# Patient Record
Sex: FEMALE | Race: WHITE | Employment: OTHER | URBAN - METROPOLITAN AREA
[De-identification: names, ages, dates, MRNs, and addresses within clinical notes are randomized per-mention and may not be internally consistent; named-entity substitution may affect disease eponyms.]

---

## 2017-05-15 ENCOUNTER — HOSPITAL ENCOUNTER (OUTPATIENT)
Dept: PHYSICAL THERAPY | Age: 62
Discharge: HOME | End: 2017-05-15
Payer: MEDICAID

## 2017-05-15 PROCEDURE — 97165 OT EVAL LOW COMPLEX 30 MIN: CPT

## 2017-05-15 PROCEDURE — 97018 PARAFFIN BATH THERAPY: CPT

## 2017-05-15 NOTE — PROGRESS NOTES
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]  Rheumatoid arthritis without rheumatoid factor, multiple sites [M06.09]    SUBJECTIVE  Pain Level (0-10 scale): 0-1/10  Any medication changes, allergies to medications, adverse drug reactions, diagnosis change, or new procedure performed?: [x] No    [] Yes (see summary sheet for update)  Subjective functional status/changes:   [] No changes reported  I have trouble with buttons, writing, scissors, piano, oral care    OBJECTIVE    Modality rationale: decrease pain and increase tissue extensibility to improve the patients ability to grasp   Min Type Additional Details    [] Estim:  []Unatt       []IFC  []Premod                        []Other:  []w/ice   []w/heat  Position:  Location:    [] Estim: []Att    []TENS instruct  []NMES                    []Other:  []w/US   []w/ice   []w/heat  Position:  Location:    []  Traction: [] Cervical       []Lumbar                       [] Prone          []Supine                       []Intermittent   []Continuous Lbs:  [] before manual  [] after manual    []  Ultrasound: []Continuous   [] Pulsed                           []1MHz   []3MHz W/cm2:  Location:    []  Iontophoresis with dexamethasone         Location: [] Take home patch   [] In clinic    []  Ice     []  heat  []  Ice massage  []  Laser   []  Anodyne Position:  Location:     10 []  Laser with stim  [x]  Other: Paraffin Position:  Location:r hand    []  Vasopneumatic Device Pressure:       [] lo [] med [] hi   Temperature: [] lo [] med [] hi   [x] Skin assessment post-treatment:  [x]intact []redness- no adverse reaction    []redness  adverse reaction:       With   [] TE [] TA   [] neuro   [] other: Patient Education: [x] Review HEP    [] Progressed/Changed HEP based on: Role of OT  [] positioning   [] body mechanics   [] transfers   [] heat/ice application   [] Splint wear/care   [] Sensory re-education   [] scar management      [] other:             Other Objective/Functional Measures:   Subjective: pt is a right hand dominant, 64 y.o.y/o, female who has had increasing essential tremors with swelling in R hand about 8 months ago. As tremors worsen, swelling seems to increase. Prior level of function: Hairdresser, grandkids, walking  Pain level:(0-no pain 10-debilitating pain) 1/10    Description/Location: right hand with c/o intermittent relief   Worst pain5/10 Least pain *0-1*/10   Activities which aggravate pain: activity, fatigue, stress,    Activities which ease pain: rest, meds  Current functional limitations/living situation: with daughter and son in law in house,  writing, brushing teeth, flossing, fasteners, lifting pans, cutting chopping, scissors, turning keys,     Medical hx: Bipolar, Sjogren's    Medications: See the written copy of this report in the patient's paper medical record.        Objective:  Edema present at MCPs 2,3  Sensation:some numbness with swelling      Strength:    Right Left   Shoulder Flex      Ext      abd      Horizontal add      IR      ER     Elbow Ext/flex     Forearm Supination      Pronation     Wrist Flex 55 65    Ext 55 75    Ulnar Dev 25 30    Radial Dev 10 20     Hand ROM    Index Middle Ring Small Thumb    MP 20-70 20-90 15-90 0-100  CMC   PIP 0-95 0-95 0-95 0-100 45 MP   DIP 0-60 0-75 0-50 0-50 50 IP         Wen Abd         Radial Abd   Radial digit walk WFL    Hand ROM    Index Middle Ring Small Thumb    MP   90   CMC   PIP   110   MP   DIP   90   IP         Wen Abd         Radial Abd       Hand Strength:   Gross Grasp 3pt Pinch Lateral Pinch Tip Pinch   Right  22 7 9 5   Left 30 13 12 11     Nine-Hole Peg Test:  Left= _23____seconds  Right=_30____seconds  Finger Opposition:WNL       Palpation: slight tenderness on MCP    ADLs  Feeding:        []MaxA   []ModA   [x]Wood   [] CGA   []SBA   []Facundo   []Independent  UE Dressing:       []MaxA   []ModA   [x]Wood   [] CGA   []SBA   []Facundo   []Independent  LE Dressing:       []MaxA   []ModA   [x]Wood   [] CGA   []SBA   []Facundo   []Independent  Grooming:       []MaxA   [x]ModA   []Wood   [] CGA   []SBA   []Facundo   []Independent  Toileting:       []MaxA   []ModA   []Wood   [] CGA   []SBA   []Facundo   [x]Independent  Bathing:       []MaxA   []ModA   []Wood   [] CGA   []SBA   []Facundo   [x]Independent  Light Meal Prep:    []MaxA   [x]ModA   []Wood   [] CGA   []SBA   []Facundo   []Independent  Household/Other: []MaxA   []ModA   []Wood   [] CGA   []SBA   []Facundo   [x]Independent  Adaptive Equip:     []MaxA   []ModA   []Wood   [] CGA   []SBA   []Facundo   []Independent  Driving:       []MaxA   []ModA   []Wood   [] CGA   []SBA   []Facundo   [x]Independent  Money Mgmt:        []MaxA   []ModA   [x]Wood   [] CGA   []SBA   []Facundo   []Independent         Pain Level (0-10 scale) post treatment: 0/10    ASSESSMENT/Changes in Function: *  X  See Plan of Care  []  See progress note/recertification  []  See Discharge Summary           PLAN  []  Upgrade activities as tolerated     []  Continue plan of care  []  Update interventions per flow sheet       []  Discharge due to:_  []  Other:_      OLEG Cody/L 5/15/2017  8:01 AM    No future appointments.

## 2017-05-15 NOTE — PROGRESS NOTES
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 Assessment/ key information: 64year old RHD female with multi year history of essential tremors which became worse in last 8 months and was accompanied by joint swelling and pain in r hand. Patient reports pain is 1/10 in morning and becomes worse as day goes on. Digit AROM is limited in extension at MCPs of digits 2-4, and in flexion at all joints. She shows ulnar deviation of digits on r which she is able to correct actively. She presents with edema in MCPs of digits 2,3 which are tender when struck. She has marked tremors in R hand that are present both at rest and with activity. Her strength is limited in both hands with  of 22 in R and 30 in L hand. Fine motor skills are slowed in R with 30 seconds for 9 hole peg test.  She reports difficulty with self care including grooming especially oral care, fasteners, writing, scissor use, opening packages and jars, and playing piano. She is recently  and diagnosed with bipolar disorder. She is living with daughter, son in law and grandchildren who are supportive.   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 She will benefit from skilled occupational therapy to improve quality of life with chronic tremors, reduce hand pain, improve ROM and functional independence. Evaluation Complexity: History LOW Complexity : Brief history review  Examination LOW Complexity : 1-3 performance deficits relating to physical, cognitive , or psychosocial skils that result in activity limitations and / or participation restrictions  Clinical Decision Making MEDIUM Complexity : Patient may present with comorbidities that affect occupational performnce. Miniml to moderate modification of tasks or assistance (eg, physical or verbal ) with assesment(s) is necessary to enable patient to complete evaluation   Overall Complexity Rating: LOW     Problem List: Pain effecting function, Decreased range of motion, Decreased strength, Edema effecting function, Decreased ADL/functional abilities  and Decreased activity tolerance     Treatment Plan may include any combination of the following: Therapeutic exercise, Therapeutic activities, Physical agent/modality, Splinting/orthoses, Patient education and ADLs/IADLs    Patient / Family readiness to learn indicated by: asking questions, trying to perform skills and interest    Persons(s) to be included in education:   patient (P)    Barriers to Learning/Limitations: None    Patient Goal (s): Mobility    Patient Self Reported Health Status: good    Rehabilitation Potential: good    Short Term Goals: To be accomplished in 2 weeks:  1. Patient will be familiar with home paraffin bath for relief of symptoms  2. Patient will be fitted with appropriate orthosis to reduce contracture risk  3. Patient will be independent in HEP to preserve ROM for function. 4.  Patient will be familiar with joint protection strategies to ease ADL/IADLs. Long Term Goals: To be accomplished in 8 treatments:   1.   Patient will incorporate joint protection and adaptive techniques/devices into self care and home care to improve function. 2.  Patient will report reduced pain with use of paraffin and exercises. 3.  Patient will report improved performance in piano and self care due to improved grasp and digit abduction. Frequency / Duration: Patient to be seen 2 times per week for 8 treatments:    Patient/ Caregiver education and instruction: Diagnosis, prognosis, self care, activity modification, brace/ splint application and exercises   [x]  Plan of care has been reviewed with TISHA Mckeon 5/15/2017 9:57 AM    _____________________________________________________________________    I certify that the above Therapy Services are being furnished while the patient is under my care. I agree with the treatment plan and certify that this therapy is necessary.     Physician's Signature:____________________  Date:____________Time:__________    Please sign and return to [facility]  [address]  [phone] fax

## 2017-05-18 ENCOUNTER — HOSPITAL ENCOUNTER (OUTPATIENT)
Dept: PHYSICAL THERAPY | Age: 62
Discharge: HOME | End: 2017-05-18
Payer: MEDICAID

## 2017-05-18 PROCEDURE — 97110 THERAPEUTIC EXERCISES: CPT

## 2017-05-18 PROCEDURE — 97760 ORTHOTIC MGMT&TRAING 1ST ENC: CPT

## 2017-05-18 NOTE — PROGRESS NOTES
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]  Rheumatoid arthritis without rheumatoid factor, multiple sites [M06.09]    SUBJECTIVE  Pain Level (0-10 scale): 0/10  Any medication changes, allergies to medications, adverse drug reactions, diagnosis change, or new procedure performed?: [x] No    [] Yes (see summary sheet for update)  Subjective functional status/changes:   [] No changes reported  Just tremory, not pain    OBJECTIVE      15 min Therapeutic Exercise:  [] See flow sheet :   Rationale: increase ROM and increase strength to improve the patients ability to use r hand  Med putty and pegs x 10, digit abd, ext recip opp         15 min Orthotic/Splinting: Resting hand orthosis   Rationale: increase ROM  to improve the patients ability to extend digits  Fabricated r suctom resting hand orthosis      With   [] TE   [] TA   [] neuro   [] other: Patient Education: [x] Review HEP    [] Progressed/Changed HEP based on:   [] positioning   [] body mechanics   [] transfers   [] heat/ice application   [x] Splint wear/care   [] Sensory re-education   [] scar management      [] other:             Other Objective/Functional Measures:   Able to perform recip opp with some difficulty with med putty     Pain Level (0-10 scale) post treatment: 0/10    ASSESSMENT/Changes in Function: splint fits well should preseve digit extension and alignment at rest    Patient will continue to benefit from skilled OT services to modify and progress therapeutic interventions, address ROM deficits, address strength deficits, analyze and address soft tissue restrictions and instruct in home and community integration to attain remaining goals.     []  See Plan of Care  []  See progress note/recertification  []  See Discharge Summary         Progress towards goals / Updated goals:  1. Patient will be familiar with home paraffin bath for relief of symptoms  2. Patient will be fitted with appropriate orthosis to reduce contracture riskmet 5/18/17  3. Patient will be independent in HEP to preserve ROM for function. 4.  Patient will be familiar with joint protection strategies to ease ADL/IADLs. Long Term Goals: To be accomplished in 8 treatments:   1. Patient will incorporate joint protection and adaptive techniques/devices into self care and home care to improve function. 2.  Patient will report reduced pain with use of paraffin and exercises. 3.  Patient will report improved performance in piano and self care due to improved grasp and digit abduction. PLAN  []  Upgrade activities as tolerated     []  Continue plan of care  []  Update interventions per flow sheet       []  Discharge due to:_  []  Other:_      Arvakira Baxter, OTR/L 5/18/2017  5:56 PM    Future Appointments  Date Time Provider Yehuda Varghese   5/23/2017 8:00 AM Arvakira Baxter, OTR/L [facility]   5/25/2017 5:00 PM Arvakira Fraserd, OTR/L [facility]   6/2/2017 10:30 AM Barb Staples, OT [facility]   6/12/2017 8:30 AM Arvakira Fraserd, OTR/L [facility]   6/15/2017 5:30 PM Arville Briad, OTR/L [facility]   6/19/2017 9:00 AM Arville Briad, OTR/L [facility]   6/22/2017 5:30 PM Arville Humlorend, OTR/L [facility]

## 2017-05-23 ENCOUNTER — HOSPITAL ENCOUNTER (OUTPATIENT)
Dept: PHYSICAL THERAPY | Age: 62
Discharge: HOME | End: 2017-05-23
Payer: MEDICAID

## 2017-05-23 PROCEDURE — 97018 PARAFFIN BATH THERAPY: CPT

## 2017-05-23 PROCEDURE — 97110 THERAPEUTIC EXERCISES: CPT

## 2017-05-23 NOTE — PROGRESS NOTES
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  i am worried about sleep loss because of the bipolar. Can i just wear it during the day?     OBJECTIVE    Modality rationale: decrease pain and increase tissue extensibility to improve the patients ability to grasp   Min Type Additional Details    [] Estim:  []Unatt       []IFC  []Premod                        []Other:  []w/ice   []w/heat  Position:  Location:    [] Estim: []Att    []TENS instruct  []NMES                    []Other:  []w/US   []w/ice   []w/heat  Position:  Location:    []  Traction: [] Cervical       []Lumbar                       [] Prone          []Supine                       []Intermittent   []Continuous Lbs:  [] before manual  [] after manual    []  Ultrasound: []Continuous   [] Pulsed                           []1MHz   []3MHz W/cm2:  Location:    []  Iontophoresis with dexamethasone         Location: [] Take home patch   [] In clinic    []  Ice     []  heat  []  Ice massage  []  Laser   []  Anodyne Position:  Location:     10 []  Laser with stim  [x]  Other: Paraffin Position:  Location:L hand    []  Vasopneumatic Device Pressure:       [] lo [] med [] hi   Temperature: [] lo [] med [] hi   [x] Skin assessment post-treatment: [x]intact []redness- no adverse reaction    []redness  adverse reaction:     30 min Therapeutic Exercise:  [] See flow sheet :   Rationale: increase ROM and increase strength to improve the patients ability to grasp  Towel scrunch x 10 B  Tendon glides r x 5, hook fist with pen x 5  Radial digit walk x 10  Med putty and pegs x 10          With   [] TE   [] TA   [] neuro   [x] other: Patient Education: [x] Review HEP    [] Progressed/Changed HEP based on: tendon glides, radial walk and towel scrunch  [] positioning   [] body mechanics   [] transfers   [] heat/ice application   [x] Splint wear/care   [] Sensory re-education   [] scar management      [x] other: Wear splint in evening while watching TV            Other Objective/Functional Measures:   Cramping of triceps with towel scrunch     Pain Level (0-10 scale) post treatment: 0/10    ASSESSMENT/Changes in Function: Improving ROM    Patient will continue to benefit from skilled OT services to modify and progress therapeutic interventions, address ROM deficits, address strength deficits and instruct in home and community integration to attain remaining goals. []  See Plan of Care  []  See progress note/recertification  []  See Discharge Summary         Progress towards goals / Updated goals:  1. Patient will be familiar with home paraffin bath for relief of symptoms  2. Patient will be fitted with appropriate orthosis to reduce contracture riskmet 5/18/17  3. Patient will be independent in HEP to preserve ROM for function. met 5/23/17  4. Patient will be familiar with joint protection strategies to ease ADL/IADLs. Long Term Goals: To be accomplished in 8 treatments:   1. Patient will incorporate joint protection and adaptive techniques/devices into self care and home care to improve function. 2.  Patient will report reduced pain with use of paraffin and exercises.   3.  Patient will report improved performance in piano and self care due to improved grasp and digit abduction. PLAN  [x]  Upgrade activities as tolerated     [x]  Continue plan of care  []  Update interventions per flow sheet       []  Discharge due to:_  []  Other:_      Vanesa Session, OTR/L 5/23/2017  9:07 AM    Future Appointments  Date Time Provider Yehuda Varghese   5/25/2017 5:00 PM Vanesa Session, OTR/L [facility]   6/2/2017 10:30 AM Jaimee Staples, OT [id number]   6/12/2017 8:30 AM Vanesa Session, OTR/L [facility]   6/15/2017 5:30 PM Vanesa Session, OTR/L [facility]   6/19/2017 9:00 AM Vanesa Session, OTR/L [facility]   6/22/2017 5:30 PM Vanesa Session, OTR/L [facility]

## 2017-05-25 ENCOUNTER — HOSPITAL ENCOUNTER (OUTPATIENT)
Dept: PHYSICAL THERAPY | Age: 62
Discharge: HOME | End: 2017-05-25
Payer: MEDICAID

## 2017-05-25 PROCEDURE — 97018 PARAFFIN BATH THERAPY: CPT

## 2017-05-25 PROCEDURE — 97535 SELF CARE MNGMENT TRAINING: CPT

## 2017-05-25 PROCEDURE — 97110 THERAPEUTIC EXERCISES: CPT

## 2017-05-25 NOTE — PROGRESS NOTES
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]  Rheumatoid arthritis without rheumatoid factor, multiple sites [M06.09]    SUBJECTIVE  Pain Level (0-10 scale): 0/10  Any medication changes, allergies to medications, adverse drug reactions, diagnosis change, or new procedure performed?: [x] No    [] Yes (see summary sheet for update)  Subjective functional status/changes:   [] No changes reported  These work well (loop scissors)  i can write better this way ( interdigital )  I am wearing the splint during the day   OBJECTIVE    Modality rationale: decrease pain and increase tissue extensibility to improve the patients ability to grasp   Min Type Additional Details    [] Estim:  []Unatt       []IFC  []Premod                        []Other:  []w/ice   []w/heat  Position:  Location:    [] Estim: []Att    []TENS instruct  []NMES                    []Other:  []w/US   []w/ice   []w/heat  Position:  Location:    []  Traction: [] Cervical       []Lumbar                       [] Prone          []Supine                       []Intermittent   []Continuous Lbs:  [] before manual  [] after manual    []  Ultrasound: []Continuous   [] Pulsed                           []1MHz   []3MHz W/cm2:  Location:    []  Iontophoresis with dexamethasone         Location: [] Take home patch   [] In clinic    []  Ice     []  heat  []  Ice massage  []  Laser   []  Anodyne Position:  Location:     10 []  Laser with stim  [x]  Other: Paraffin Position:  Location:r hand    []  Vasopneumatic Device Pressure:       [] lo [] med [] hi   Temperature: [] lo [] med [] hi   [x] Skin assessment post-treatment:  [x]intact []redness- no adverse reaction []redness  adverse reaction:     10 min Therapeutic Exercise:  [] See flow sheet :   Rationale: increase ROM and increase strength to improve the patients ability to grasp  Med putty and pegs x 10, recip opp x 10        20 min Self Care/Home Management: Writing, cutting, jt protection   Rationale: Equipment  to improve the patients ability to cut write  Loop scissors, rolling scissors, foam tubing for pen vs interdigital grasp  Patient performed well with loop scissors and interdigital grasp on pen    With   [] TE   [] TA   [] neuro   [] other: Patient Education: [x] Review HEP    [] Progressed/Changed HEP based on:   [] positioning   [] body mechanics   [] transfers   [] heat/ice application   [] Splint wear/care   [] Sensory re-education   [] scar management      [x] other: Adaptive equipment, joint protection handout            Other Objective/Functional Measures:   Able to write well with interdigital grasp     Pain Level (0-10 scale) post treatment: 0/10    ASSESSMENT/Changes in Function: improved awareness of adaptive equipment and task modifications    Patient will continue to benefit from skilled OT services to modify and progress therapeutic interventions, address ROM deficits, address strength deficits, analyze and address soft tissue restrictions, analyze and cue movement patterns and instruct in home and community integration to attain remaining goals. []  See Plan of Care  []  See progress note/recertification  []  See Discharge Summary         Progress towards goals / Updated goals:  1. Patient will be familiar with home paraffin bath for relief of symptoms  2. Patient will be fitted with appropriate orthosis to reduce contracture riskmet 5/18/17  3. Patient will be independent in HEP to preserve ROM for function. met 5/23/17  4. Patient will be familiar with joint protection strategies to ease ADL/IADLs. handout 5/25/17    Long Term Goals: To be accomplished in 8 treatments:   1.   Patient will incorporate joint protection and adaptive techniques/devices into self care and home care to improve function. 2.  Patient will report reduced pain with use of paraffin and exercises. no pain after exercise with paraffin today 5/25/17  3. Patient will report improved performance in piano and self care due to improved grasp and digit abduction. PLAN  []  Upgrade activities as tolerated     []  Continue plan of care  []  Update interventions per flow sheet       []  Discharge due to:_  []  Other:_      OLEG Xiong/L 5/25/2017  5:45 PM    Future Appointments  Date Time Provider Yehuda Varghese   6/2/2017 10:30 AM Paola Staples OT [id number]   6/12/2017 8:30 AM Niurka Corral OTR/L [facility]   6/15/2017 5:30 PM Niurka Corral OTR/L [facility]   6/19/2017 9:00 AM Niurka Corral OTR/L [facility]   6/22/2017 5:30 PM Niurka Corral OTR/L [facility]

## 2017-06-02 ENCOUNTER — HOSPITAL ENCOUNTER (OUTPATIENT)
Dept: PHYSICAL THERAPY | Age: 62
Discharge: HOME | End: 2017-06-02
Payer: MEDICAID

## 2017-06-02 PROCEDURE — 97110 THERAPEUTIC EXERCISES: CPT

## 2017-06-02 PROCEDURE — 97018 PARAFFIN BATH THERAPY: CPT

## 2017-06-02 NOTE — PROGRESS NOTES
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 \"    OBJECTIVE  Modality rationale: decrease pain and increase tissue extensibility to improve the patients ability to use right hand for functional tasks   Min Type Additional Details    [] Estim: []Att   []Unatt        []TENS instruct                  []IFC  []Premod   []NMES                     []Other:  []w/US   []w/ice   []w/heat  Position:  Location:    []  Ultrasound: []Continuous   [] Pulsed                           []1MHz   []3MHz Location:  W/cm2:    []  Iontophoresis with dexamethasone         Location: [] Take home patch   [] In clinic   10 []  Ice     []  heat  []  Ice massage  [x]  Paraffin Position:  Location: right hand    []  Vasopneumatic Device Pressure:       [] lo [] med [] hi   Temperature: [] lo [] med [] hi   [x] Skin assessment post-treatment:  [x]intact []redness- no adverse reaction     []redness  adverse reaction:     30 min Therapeutic Exercise: [x] See flow sheet : Med putty with pegs, intrinsic stretches 5x10, MP flexion AROM, intrinsics with picking up small foam pieces,  pom poms with MP flexion, Chinese medicine balls.    Rationale: decrease intrinsic muscular tightness and allow greater functional ROM/strength with right hand for carryover with grasping. Other Objective/Functional Measures: intrinsic tightness     Pain Level (0-10 scale) post treatment: 0/10    ASSESSMENT/Changes in Function: Able to complete all therapeutic exercises/activities with no symptom increase. Requires 1 modified position during [place] medicine balls due to decreased hand ROM/strength/coordination. Patient will continue to benefit from skilled OT services to modify and progress therapeutic interventions, address ROM deficits, address strength deficits, analyze and address soft tissue restrictions, analyze and cue movement patterns and instruct in home and community integration to attain remaining goals. []  See Plan of Care  []  See progress note/recertification  []  See Discharge Summary         Progress towards goals / Updated goals:  1. Patient will be familiar with home paraffin bath for relief of symptoms  2. Patient will be fitted with appropriate orthosis to reduce contracture riskmet 5/18/17  3. Patient will be independent in HEP to preserve ROM for function. met 5/23/17  4. Patient will be familiar with joint protection strategies to ease ADL/IADLs. handout 5/25/17     Long Term Goals: To be accomplished in 8 treatments:   1. Patient will incorporate joint protection and adaptive techniques/devices into self care and home care to improve function. 2. Patient will report reduced pain with use of paraffin and exercises. no pain after exercise with paraffin today 5/25/17  3. Patient will report improved performance in piano and self care due to improved grasp and digit abduction. PLAN  [x]  Upgrade activities as tolerated     [x]  Continue plan of care  []  Update interventions per flow sheet       []  Discharge due to:_  []  Other:_      Peewee Staples OT 6/2/2017  1:06 PM

## 2017-06-12 ENCOUNTER — HOSPITAL ENCOUNTER (OUTPATIENT)
Dept: PHYSICAL THERAPY | Age: 62
Discharge: HOME | End: 2017-06-12
Payer: MEDICAID

## 2017-06-12 PROCEDURE — 97018 PARAFFIN BATH THERAPY: CPT

## 2017-06-12 PROCEDURE — 97110 THERAPEUTIC EXERCISES: CPT

## 2017-06-12 NOTE — PROGRESS NOTES
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 No more questions about joint protection things.   i would like to get a paraffin for home    OBJECTIVE    Modality rationale: decrease pain and increase tissue extensibility to improve the patients ability to extend digits grasp   Min Type Additional Details    [] Estim:  []Unatt       []IFC  []Premod                        []Other:  []w/ice   []w/heat  Position:  Location:    [] Estim: []Att    []TENS instruct  []NMES                    []Other:  []w/US   []w/ice   []w/heat  Position:  Location:    []  Traction: [] Cervical       []Lumbar                       [] Prone          []Supine                       []Intermittent   []Continuous Lbs:  [] before manual  [] after manual    []  Ultrasound: []Continuous   [] Pulsed                           []1MHz   []3MHz W/cm2:  Location:    []  Iontophoresis with dexamethasone         Location: [] Take home patch   [] In clinic    []  Ice     []  heat  []  Ice massage  []  Laser   []  Anodyne Position:  Location:     10 []  Laser with stim  [x]  Other: paraffin Position:  Location:r hand    []  Vasopneumatic Device Pressure:       [] lo [] med [] hi   Temperature: [] lo [] med [] hi   [x] Skin assessment post-treatment:  [x]intact []redness- no adverse reaction []redness  adverse reaction:     28 min Therapeutic Exercise:  [] See flow sheet :   Rationale: increase ROM, increase strength and improve coordination to improve the patients ability to grasp  Med putty HEP tendon glides, digit abduction, recip opp etc 10x each  Digit ab-add, puff ball    Chinese balls x 20      With   [x] TE   [] TA   [] neuro   [] other: Patient Education: [x] Review HEP    [] Progressed/Changed HEP based on: putty ex  [] positioning   [] body mechanics   [] transfers   [] heat/ice application   [] Splint wear/care   [] Sensory re-education   [] scar management      [x] other: home paraffin            Other Objective/Functional Measures:   Improved strength abduction with putty     Pain Level (0-10 scale) post treatment: 0/10    ASSESSMENT/Changes in Function: improving ROm, strength    Patient will continue to benefit from skilled OT services to modify and progress therapeutic interventions, address ROM deficits, address strength deficits, analyze and address soft tissue restrictions and instruct in home and community integration to attain remaining goals. []  See Plan of Care  []  See progress note/recertification  []  See Discharge Summary         Progress towards goals / Updated goals:  1. Patient will be familiar with home paraffin bath for relief of symptomsmet 6/12/17  2. Patient will be fitted with appropriate orthosis to reduce contracture riskmet 5/18/17  3. Patient will be independent in HEP to preserve ROM for function. met 5/23/17  4. Patient will be familiar with joint protection strategies to ease ADL/IADLs. handout 5/25/17     Long Term Goals: To be accomplished in 8 treatments:   1. Patient will incorporate joint protection and adaptive techniques/devices into self care and home care to improve function. 2. Patient will report reduced pain with use of paraffin and exercises. no pain after exercise with paraffin today 5/25/17  3.  Patient will report improved performance in piano and self care due to improved grasp and digit abduction.          PLAN  [x]  Upgrade activities as tolerated     [x]  Continue plan of care  []  Update interventions per flow sheet       []  Discharge due to:_  []  Other:_      Esme Ocasio OTR/L 6/12/2017  9:25 AM    Future Appointments  Date Time Provider Yehuda Varghese   6/15/2017 5:30 PM Esme Ocasio OTR/L [facility]   6/19/2017 9:00 AM Esme Ocasio OTR/L [facility]   6/22/2017 5:30 PM Esme Ocasio, OTR/L [facility]

## 2017-06-15 ENCOUNTER — APPOINTMENT (OUTPATIENT)
Dept: PHYSICAL THERAPY | Age: 62
End: 2017-06-15
Payer: MEDICAID

## 2017-06-19 ENCOUNTER — HOSPITAL ENCOUNTER (OUTPATIENT)
Dept: PHYSICAL THERAPY | Age: 62
Discharge: HOME | End: 2017-06-19
Payer: MEDICAID

## 2017-06-19 PROCEDURE — 97110 THERAPEUTIC EXERCISES: CPT

## 2017-06-19 PROCEDURE — 97018 PARAFFIN BATH THERAPY: CPT

## 2017-06-19 PROCEDURE — 97535 SELF CARE MNGMENT TRAINING: CPT

## 2017-06-19 NOTE — PROGRESS NOTES
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       OBJECTIVE    Modality rationale: decrease pain and increase tissue extensibility to improve the patients ability to gras[   Min Type Additional Details    [] Estim:  []Unatt       []IFC  []Premod                        []Other:  []w/ice   []w/heat  Position:  Location:    [] Estim: []Att    []TENS instruct  []NMES                    []Other:  []w/US   []w/ice   []w/heat  Position:  Location:    []  Traction: [] Cervical       []Lumbar                       [] Prone          []Supine                       []Intermittent   []Continuous Lbs:  [] before manual  [] after manual    []  Ultrasound: []Continuous   [] Pulsed                           []1MHz   []3MHz W/cm2:  Location:    []  Iontophoresis with dexamethasone         Location: [] Take home patch   [] In clinic    []  Ice     []  heat  []  Ice massage  []  Laser   []  Anodyne Position:  Location:     10 []  Laser with stim  [x]  Other: Paraffin Position:  Location:R hand    []  Vasopneumatic Device Pressure:       [] lo [] med [] hi   Temperature: [] lo [] med [] hi   [x] Skin assessment post-treatment:  [x]intact []redness- no adverse reaction    []redness  adverse reaction:     10 min Therapeutic Exercise:  [] See flow sheet :   Rationale: increase ROM and increase strength to improve the patients ability to grasp  Recheck for note  Chinese balls x 20      10 min Self Care/Home Management: Kitchen skills   Rationale: adaptive equipment  to improve the patients ability to use kitchen utensils safely  Introduced big , finger guide, palm angelique etc    With   [] TE   [] TA   [] neuro   [x] other: Patient Education: [x] Review HEP    [] Progressed/Changed HEP based on:   [] positioning   [] body mechanics   [] transfers   [] heat/ice application   [] Splint wear/care   [] Sensory re-education   [] scar management      [x] other:kitchen equipment             Other Objective/Functional Measures:     Hand Strength: Gross Grasp 3pt Pinch Lateral Pinch Tip Pinch   Right   30 (22) 8 (7) 11 (9) 6 (5)   Left       9 hole 26 (30)    Wrist   Flex 70 (55)  Ext 60 (55)       Pain Level (0-10 scale) post treatment: 0/10    ASSESSMENT/Changes in Function: Improved strength, ROM    []  See Plan of Care  []  See progress note/recertification  [x]  See Discharge Summary         Progress towards goals / Updated goals:  1. Patient will be familiar with home paraffin bath for relief of symptomsmet 6/12/17  2. Patient will be fitted with appropriate orthosis to reduce contracture riskmet 5/18/17  3. Patient will be independent in HEP to preserve ROM for function. met 5/23/17  4. Patient will be familiar with joint protection strategies to ease ADL/IADLs. handout 5/25/17     Long Term Goals: To be accomplished in 8 treatments:   1. Patient will incorporate joint protection and adaptive techniques/devices into self care and home care to improve function. met 6/19/17  2. Patient will report reduced pain with use of paraffin and exercises. no pain after exercise with paraffin today 5/25/17  3. Patient will report improved performance in piano and self care due to improved grasp and digit abduction.  Met 6/19/17      PLAN  []  Upgrade activities as tolerated     []  Continue plan of care  []  Update interventions per flow sheet       [x]  Discharge due to:goals met/partially met_  []  Other:_      TISHA Monreal 6/19/2017  9:09 AM    Future Appointments  Date Time Provider Yehuda Varghese   6/22/2017 5:30 PM TISHA Monreal [facility]

## 2017-06-21 NOTE — PROGRESS NOTES
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 She has HEP. Current measurements are listed below with prior in ( )    Hand Strength: Gross Grasp 3pt Pinch Lateral Pinch Tip Pinch   Right   30 (22) 8 (7) 11 (9) 6 (5)   Left       9 hole 26 (30)    Wrist   Flex 70 (55)  Ext 60 (55)      Goal:1. Patient will be familiar with home paraffin bath for relief of symptoms  Status at last note/certification:Unaware  Status at discharge: met    Goal:2. Patient will be fitted with appropriate orthosis to reduce contracture risk  Status at last note/certification:did not have  Status at discharge: met    Goal:3. Patient will be independent in HEP to preserve ROM for function. Status at last note/certification:Did not have  Status at discharge: met    Goal:4. Patient will be familiar with joint protection strategies to ease ADL/IADLs. Status at last note/certification:Unaware  Status at discharge: met    LT Goal:1. Patient will incorporate joint protection and adaptive techniques/devices into self care and home care to improve function. met 17  Status at last note/certification:Unaware and did not use joint protection and adaptive techniques  Status at discharge: met    LTGoal:2. Patient will report reduced pain with use of paraffin and exercises. Status at last note/certification:Pain present with use  Status at discharge: met    LTGoal:3. Patient will report improved performance in piano and self care due to improved grasp and digit abduction.    Status at last note/certification:difficulty playing piano  Status at discharge: met    ASSESSMENT/Changes in Function: Improved strength, coordination, function    ASSESSMENT/RECOMMENDATIONS:  [x]Discontinue therapy: [x]Patient has reached or is progressing toward set goals      []Patient is non-compliant or has abdicated      []Due to lack of appreciable progress towards set goals    Jose Northern, OTR/L 6/21/2017 8:57 AM

## 2017-06-22 ENCOUNTER — APPOINTMENT (OUTPATIENT)
Dept: PHYSICAL THERAPY | Age: 62
End: 2017-06-22
Payer: MEDICAID

## 2017-09-06 ENCOUNTER — IMPORTED ENCOUNTER (OUTPATIENT)
Dept: URBAN - METROPOLITAN AREA CLINIC 1 | Facility: CLINIC | Age: 62
End: 2017-09-06

## 2017-09-06 PROBLEM — H43.811: Noted: 2017-09-06

## 2017-09-06 PROBLEM — H04.123: Noted: 2017-09-06

## 2017-09-06 PROBLEM — H25.813: Noted: 2017-09-06

## 2017-09-06 PROCEDURE — 92014 COMPRE OPH EXAM EST PT 1/>: CPT

## 2017-09-06 NOTE — PATIENT DISCUSSION
1.  Cataract OU: Observe for now without intervention. The patient was advised to contact us if any change or worsening of vision2. Dry Eyes OU -- Recommended to patient to use Artificial Tears BID OU3. PVD w/o Tear OD - RD precautions. Patient defers the refraction at today's visitReturn for an appointment in 1 year 27 with Dr. Simin Benavides.

## 2018-09-21 ENCOUNTER — IMPORTED ENCOUNTER (OUTPATIENT)
Dept: URBAN - METROPOLITAN AREA CLINIC 1 | Facility: CLINIC | Age: 63
End: 2018-09-21

## 2018-09-21 PROBLEM — H25.813: Noted: 2018-09-21

## 2018-09-21 PROBLEM — H43.813: Noted: 2018-09-21

## 2018-09-21 PROBLEM — H04.123: Noted: 2018-09-21

## 2018-09-21 PROBLEM — H43.811: Noted: 2018-09-21

## 2018-09-21 PROCEDURE — 92014 COMPRE OPH EXAM EST PT 1/>: CPT

## 2018-09-21 NOTE — PATIENT DISCUSSION
1.  Cataracts OU -- Observe for now without intervention. The patient was advised to contact us if any change or worsening of vision. 2. Dry Eyes OU (Sjogrens Syndrome) -- Recommend the frequent use of OTC AT's BID-QID OU. 3. PVD w/o Tear OU -- RD precautions given. Patient defers the refraction at today's visit. Return for an appointment in 1 YR for a 30 OU with Dr. Jeanne Romo.

## 2019-09-20 ENCOUNTER — IMPORTED ENCOUNTER (OUTPATIENT)
Dept: URBAN - METROPOLITAN AREA CLINIC 1 | Facility: CLINIC | Age: 64
End: 2019-09-20

## 2019-09-20 PROBLEM — H04.123: Noted: 2019-09-20

## 2019-09-20 PROBLEM — H16.143: Noted: 2019-09-20

## 2019-09-20 PROBLEM — H43.813: Noted: 2019-09-20

## 2019-09-20 PROBLEM — H25.813: Noted: 2019-09-20

## 2019-09-20 PROCEDURE — 92014 COMPRE OPH EXAM EST PT 1/>: CPT

## 2019-09-20 NOTE — PATIENT DISCUSSION
1.  Cataract OU: Observe for now without intervention. The patient was advised to contact us if any change or worsening of vision2. PVD OU - RD precautions. 3.  JUDI w/ PEK OU- Continue ATs TID OU Routinely. MRx deferred Return for an appointment in 1 yr 27 with Dr. Patrick Baker.

## 2020-10-23 ENCOUNTER — IMPORTED ENCOUNTER (OUTPATIENT)
Dept: URBAN - METROPOLITAN AREA CLINIC 1 | Facility: CLINIC | Age: 65
End: 2020-10-23

## 2020-10-23 PROBLEM — H04.123: Noted: 2020-10-23

## 2020-10-23 PROBLEM — H16.143: Noted: 2020-10-23

## 2020-10-23 PROBLEM — H43.813: Noted: 2020-10-23

## 2020-10-23 PROBLEM — H25.813: Noted: 2020-10-23

## 2020-10-23 PROCEDURE — 92015 DETERMINE REFRACTIVE STATE: CPT

## 2020-10-23 PROCEDURE — 92014 COMPRE OPH EXAM EST PT 1/>: CPT

## 2020-10-23 NOTE — PATIENT DISCUSSION
1.  Cataract OU: Observe for now without intervention. The patient was advised to contact us if any change or worsening of vision2. JUDI w/ PEK OU - Pt states using ATs OU PRN. Recommend increase ATs to BID-TID OU routinely. 3. PVD OU - old stable OU. RD precautions. MRX for glasses given. Return for an appointment in 1 year 27 with Dr. Mindy Jules.

## 2022-04-02 ASSESSMENT — TONOMETRY
OS_IOP_MMHG: 17
OD_IOP_MMHG: 17
OS_IOP_MMHG: 16
OS_IOP_MMHG: 17
OD_IOP_MMHG: 17
OD_IOP_MMHG: 18
OD_IOP_MMHG: 16
OS_IOP_MMHG: 18

## 2022-04-02 ASSESSMENT — VISUAL ACUITY
OD_CC: 20/30
OD_GLARE: 20/80
OS_CC: J1
OD_CC: J3
OS_CC: 20/40
OD_CC: J1
OD_CC: J2
OD_CC: 20/25-1
OS_CC: 20/25-1
OS_CC: J3
OS_CC: J2
OD_CC: 20/25-1
OS_CC: 20/25-1
OD_CC: J1
OS_CC: 20/25-1
OD_CC: 20/25-1
OS_CC: J1
OS_GLARE: 20/80

## 2023-06-14 NOTE — PATIENT DISCUSSION
Cataract OU: Observe for now without intervention.  The patient was advised to contact us if any change or worsening of vision [As Noted in HPI] : as noted in HPI [Negative] : Heme/Lymph